# Patient Record
Sex: FEMALE
[De-identification: names, ages, dates, MRNs, and addresses within clinical notes are randomized per-mention and may not be internally consistent; named-entity substitution may affect disease eponyms.]

---

## 2019-03-28 PROBLEM — Z00.00 ENCOUNTER FOR PREVENTIVE HEALTH EXAMINATION: Status: ACTIVE | Noted: 2019-03-28

## 2019-04-01 ENCOUNTER — APPOINTMENT (OUTPATIENT)
Dept: HEART AND VASCULAR | Facility: CLINIC | Age: 34
End: 2019-04-01
Payer: MEDICAID

## 2019-04-01 VITALS
HEART RATE: 64 BPM | OXYGEN SATURATION: 99 % | BODY MASS INDEX: 26.11 KG/M2 | DIASTOLIC BLOOD PRESSURE: 68 MMHG | TEMPERATURE: 98.1 F | WEIGHT: 132.98 LBS | SYSTOLIC BLOOD PRESSURE: 110 MMHG | HEIGHT: 59.84 IN

## 2019-04-01 DIAGNOSIS — Z82.3 FAMILY HISTORY OF STROKE: ICD-10-CM

## 2019-04-01 DIAGNOSIS — Z82.49 FAMILY HISTORY OF ISCHEMIC HEART DISEASE AND OTHER DISEASES OF THE CIRCULATORY SYSTEM: ICD-10-CM

## 2019-04-01 PROCEDURE — 93000 ELECTROCARDIOGRAM COMPLETE: CPT

## 2019-04-01 PROCEDURE — 99203 OFFICE O/P NEW LOW 30 MIN: CPT

## 2019-04-01 NOTE — HISTORY OF PRESENT ILLNESS
[FreeTextEntry1] : Gave birth to 8th child 6 weeks ago (3 miscarriages). (Javi)\par oldest child is 14. 3 girls and 5 boys.\par \par Post partum: chest pain. faint. Palpitations on monitor. Pulse went to 38.\par Cardiologist saw her. ([facility] in [place])\par got some fluids. \par \par saw Dr. Thomas Lisa as outpatient 3/13/19 (1 month after birth). [phone]\par ECHO. EF 40-45%. Global hypokinesis\par started coreg 3.125 bid, now on 6.25 bid.\par no other meds.\par Breastfeeding.\par denies SOB, CP, Orthopnea, NAVARRO\par \par No past medical history\par

## 2019-04-01 NOTE — REASON FOR VISIT
[Initial Evaluation] : an initial evaluation of [Heart Failure] : congestive heart failure [FreeTextEntry1] : PPCM

## 2019-04-01 NOTE — ASSESSMENT
[FreeTextEntry1] : 35 yo women, Gave birth to 8th child 6 weeks ago (3 miscarriages). P11, G 8\par oldest child is 14.\par \par PPCM\par post partum found to have depressed EF (ECHO not done until 4 weeks after delivery) of 40-45%\par On coreg 6.25 bid (fro 1 week). With HR 64 and BP sys 110, will not uptitrate today. has appointment with Dr. Lisa in 3 weeks where dosing will again be assessed and uptitration considered. (target if tolerated, Coreg 25 bid)\par \par Long discussion with patient and .\par discussed PPCM.\par Advise that EF 40-45% not normal but not dangerously low.\par With current BP, will not recommend entresto.\par \par Discussed:\par life time use of coreg (or Toprol)\par STRONG MEDICAL ADVICE NOT TO GET PREGNANT AGAIN GIVEN RISK OF ADVERSE CARDIAC EVENTS.\par repeat ECHO after GDMT. Re-evaluate EF and decide about indications for prophylactiv ICD.\par consideration of limiting breast feeding to 4 months given possible contribution of prolcatin to PPCM.\par daily walks/ exercise\par \par Adequate time allowed for questions and discussion\par \par \par \par

## 2019-04-01 NOTE — PHYSICAL EXAM
[General Appearance - Well Developed] : well developed [Normal Appearance] : normal appearance [Well Groomed] : well groomed [General Appearance - Well Nourished] : well nourished [No Deformities] : no deformities [General Appearance - In No Acute Distress] : no acute distress [Normal Conjunctiva] : the conjunctiva exhibited no abnormalities [Eyelids - No Xanthelasma] : the eyelids demonstrated no xanthelasmas [Normal Oral Mucosa] : normal oral mucosa [No Oral Pallor] : no oral pallor [No Oral Cyanosis] : no oral cyanosis [Normal Jugular Venous A Waves Present] : normal jugular venous A waves present [Normal Jugular Venous V Waves Present] : normal jugular venous V waves present [No Jugular Venous Mcfarland A Waves] : no jugular venous mcfarland A waves [Heart Rate And Rhythm] : heart rate and rhythm were normal [Heart Sounds] : normal S1 and S2 [Murmurs] : no murmurs present [Respiration, Rhythm And Depth] : normal respiratory rhythm and effort [Exaggerated Use Of Accessory Muscles For Inspiration] : no accessory muscle use [Auscultation Breath Sounds / Voice Sounds] : lungs were clear to auscultation bilaterally [Abdomen Soft] : soft [Abdomen Tenderness] : non-tender [Abdomen Mass (___ Cm)] : no abdominal mass palpated [Abnormal Walk] : normal gait [Gait - Sufficient For Exercise Testing] : the gait was sufficient for exercise testing [Nail Clubbing] : no clubbing of the fingernails [Cyanosis, Localized] : no localized cyanosis [Petechial Hemorrhages (___cm)] : no petechial hemorrhages [Skin Color & Pigmentation] : normal skin color and pigmentation [] : no rash [No Venous Stasis] : no venous stasis [Skin Lesions] : no skin lesions [No Skin Ulcers] : no skin ulcer [No Xanthoma] : no  xanthoma was observed [Oriented To Time, Place, And Person] : oriented to person, place, and time [Affect] : the affect was normal [Mood] : the mood was normal [No Anxiety] : not feeling anxious

## 2019-08-26 ENCOUNTER — APPOINTMENT (OUTPATIENT)
Dept: HEART AND VASCULAR | Facility: CLINIC | Age: 34
End: 2019-08-26
Payer: SELF-PAY

## 2019-08-26 VITALS
DIASTOLIC BLOOD PRESSURE: 81 MMHG | BODY MASS INDEX: 25.91 KG/M2 | HEART RATE: 52 BPM | TEMPERATURE: 97.3 F | WEIGHT: 132 LBS | OXYGEN SATURATION: 99 % | HEIGHT: 60 IN | SYSTOLIC BLOOD PRESSURE: 115 MMHG

## 2019-08-26 DIAGNOSIS — I50.22 CHRONIC SYSTOLIC (CONGESTIVE) HEART FAILURE: ICD-10-CM

## 2019-08-26 PROCEDURE — 99212 OFFICE O/P EST SF 10 MIN: CPT

## 2019-08-26 RX ORDER — CARVEDILOL PHOSPHATE 10 MG/1
10 CAPSULE, EXTENDED RELEASE ORAL
Refills: 0 | Status: DISCONTINUED | COMMUNITY
End: 2019-08-26

## 2019-08-26 NOTE — ASSESSMENT
[FreeTextEntry1] : 33 yo women, Gave birth to 8th child 6 weeks ago (3 miscarriages). P11, G 8\par oldest child is 14.\par \par PPCM\par post partum found to have depressed EF (ECHO not done until 4 weeks after delivery) of 40-45%\par On coreg 6.25 bid (fro 1 week). With HR 64 and BP sys 110, will not uptitrate today. has appointment with Dr. Lisa in 3 weeks where dosing will again be assessed and uptitration considered. (target if tolerated, Coreg 25 bid)\par \par Long discussion with patient and .\par discussed PPCM.\par Advise that EF 40-45% not normal but not dangerously low.\par With current BP, will not recommend entresto.\par \par Discussed:\par life time use of coreg (or Toprol)\par STRONG MEDICAL ADVICE NOT TO GET PREGNANT AGAIN GIVEN RISK OF ADVERSE CARDIAC EVENTS.\par repeat ECHO after GDMT. Re-evaluate EF and decide about indications for prophylactiv ICD.\par consideration of limiting breast feeding to 4 months given possible contribution of prolcatin to PPCM.\par daily walks/ exercise\par \par Adequate time allowed for questions and discussion\par \par \par \par

## 2019-08-26 NOTE — HISTORY OF PRESENT ILLNESS
[FreeTextEntry1] : seen here 4/1/19\par \par Gave birth to 8th child Feb 2019 (hx 3 miscarriages). (Javi)\par oldest child is 14. 3 girls and 5 boys.\par \par Post partum: chest pain. faint. Palpitations on monitor. Pulse went to 38.\par Cardiologist saw her. ([facility] in [place])\par got some fluids. \par \par saw Dr. Thomas Lisa as outpatient 3/13/19 (1 month after birth). [phone]\par ECHO. EF 40-45%. Global hypokinesis\par started coreg 3.125 bid, now on 6.25 bid.\par no other meds.\par Breastfeeding.\par denies SOB, CP, Orthopnea, NAVARRO\par \par at last visit:\par Long discussion with patient and .\par discussed PPCM.\par Advise that EF 40-45% not normal but not dangerously low.\par With current BP, will not recommend entresto.\par \par Discussed:\par life time use of coreg (or Toprol)\par STRONG MEDICAL ADVICE NOT TO GET PREGNANT AGAIN GIVEN RISK OF ADVERSE CARDIAC EVENTS.\par repeat ECHO after GDMT. Re-evaluate EF and decide about indications for prophylactiv ICD.\par \par daily walks/ exercise\par \par she now reports she feels fatigue and low energy. SHe did not feel this way after previous pregnancies. She is concerned that it may be due to the coreg CR 10 bid\par \par of particular note, Dr. Lisa did an echo on 6/5/19 and now her EF has normalized to 55-60% and was improved compared to prior echo\par

## 2019-08-26 NOTE — REASON FOR VISIT
[Heart Failure] : congestive heart failure [Follow-Up - Clinic] : a clinic follow-up of [FreeTextEntry1] : PPCM

## 2019-08-26 NOTE — PHYSICAL EXAM
[General Appearance - Well Developed] : well developed [Normal Appearance] : normal appearance [Well Groomed] : well groomed [General Appearance - Well Nourished] : well nourished [No Deformities] : no deformities [General Appearance - In No Acute Distress] : no acute distress [Normal Conjunctiva] : the conjunctiva exhibited no abnormalities [Eyelids - No Xanthelasma] : the eyelids demonstrated no xanthelasmas [Normal Oral Mucosa] : normal oral mucosa [No Oral Pallor] : no oral pallor [No Oral Cyanosis] : no oral cyanosis [Normal Jugular Venous A Waves Present] : normal jugular venous A waves present [No Jugular Venous Mcfarland A Waves] : no jugular venous mcfarland A waves [Normal Jugular Venous V Waves Present] : normal jugular venous V waves present [Respiration, Rhythm And Depth] : normal respiratory rhythm and effort [Exaggerated Use Of Accessory Muscles For Inspiration] : no accessory muscle use [Auscultation Breath Sounds / Voice Sounds] : lungs were clear to auscultation bilaterally [Heart Rate And Rhythm] : heart rate and rhythm were normal [Heart Sounds] : normal S1 and S2 [Murmurs] : no murmurs present [Abdomen Soft] : soft [Abdomen Mass (___ Cm)] : no abdominal mass palpated [Abdomen Tenderness] : non-tender [Abnormal Walk] : normal gait [Gait - Sufficient For Exercise Testing] : the gait was sufficient for exercise testing [Nail Clubbing] : no clubbing of the fingernails [Petechial Hemorrhages (___cm)] : no petechial hemorrhages [Cyanosis, Localized] : no localized cyanosis [Skin Color & Pigmentation] : normal skin color and pigmentation [No Venous Stasis] : no venous stasis [] : no rash [No Skin Ulcers] : no skin ulcer [Skin Lesions] : no skin lesions [No Xanthoma] : no  xanthoma was observed [Affect] : the affect was normal [Oriented To Time, Place, And Person] : oriented to person, place, and time [No Anxiety] : not feeling anxious [Mood] : the mood was normal